# Patient Record
Sex: FEMALE | Race: BLACK OR AFRICAN AMERICAN | NOT HISPANIC OR LATINO | Employment: FULL TIME | URBAN - METROPOLITAN AREA
[De-identification: names, ages, dates, MRNs, and addresses within clinical notes are randomized per-mention and may not be internally consistent; named-entity substitution may affect disease eponyms.]

---

## 2023-04-25 ENCOUNTER — APPOINTMENT (OUTPATIENT)
Dept: PRIMARY CARE | Facility: CLINIC | Age: 58
End: 2023-04-25
Payer: COMMERCIAL

## 2023-09-05 ENCOUNTER — OFFICE VISIT (OUTPATIENT)
Dept: PRIMARY CARE | Facility: CLINIC | Age: 58
End: 2023-09-05
Payer: COMMERCIAL

## 2023-09-05 VITALS
BODY MASS INDEX: 23.96 KG/M2 | DIASTOLIC BLOOD PRESSURE: 96 MMHG | SYSTOLIC BLOOD PRESSURE: 157 MMHG | WEIGHT: 161.8 LBS | TEMPERATURE: 97.8 F | RESPIRATION RATE: 18 BRPM | HEIGHT: 69 IN | HEART RATE: 62 BPM

## 2023-09-05 DIAGNOSIS — I10 HYPERTENSION, UNSPECIFIED TYPE: ICD-10-CM

## 2023-09-05 DIAGNOSIS — R51.9 ACUTE NONINTRACTABLE HEADACHE, UNSPECIFIED HEADACHE TYPE: ICD-10-CM

## 2023-09-05 DIAGNOSIS — S06.0X0D CONCUSSION WITHOUT LOSS OF CONSCIOUSNESS, SUBSEQUENT ENCOUNTER: Primary | ICD-10-CM

## 2023-09-05 DIAGNOSIS — H05.89 ORBITAL LESION: ICD-10-CM

## 2023-09-05 DIAGNOSIS — E55.9 HYPOVITAMINOSIS D: ICD-10-CM

## 2023-09-05 DIAGNOSIS — Z00.00 HEALTHCARE MAINTENANCE: ICD-10-CM

## 2023-09-05 DIAGNOSIS — Z12.11 COLON CANCER SCREENING: ICD-10-CM

## 2023-09-05 PROBLEM — S06.0X0A CONCUSSION WITH NO LOSS OF CONSCIOUSNESS: Status: ACTIVE | Noted: 2023-09-05

## 2023-09-05 LAB
ALANINE AMINOTRANSFERASE (SGPT) (U/L) IN SER/PLAS: 9 U/L (ref 7–45)
ALBUMIN (G/DL) IN SER/PLAS: 4.4 G/DL (ref 3.4–5)
ALKALINE PHOSPHATASE (U/L) IN SER/PLAS: 74 U/L (ref 33–110)
ANION GAP IN SER/PLAS: 15 MMOL/L (ref 10–20)
ASPARTATE AMINOTRANSFERASE (SGOT) (U/L) IN SER/PLAS: 13 U/L (ref 9–39)
BILIRUBIN TOTAL (MG/DL) IN SER/PLAS: 0.2 MG/DL (ref 0–1.2)
CALCIDIOL (25 OH VITAMIN D3) (NG/ML) IN SER/PLAS: 16 NG/ML
CALCIUM (MG/DL) IN SER/PLAS: 9.8 MG/DL (ref 8.6–10.6)
CARBON DIOXIDE, TOTAL (MMOL/L) IN SER/PLAS: 28 MMOL/L (ref 21–32)
CHLORIDE (MMOL/L) IN SER/PLAS: 105 MMOL/L (ref 98–107)
CHOLESTEROL (MG/DL) IN SER/PLAS: 213 MG/DL (ref 0–199)
CHOLESTEROL IN HDL (MG/DL) IN SER/PLAS: 49.4 MG/DL
CHOLESTEROL/HDL RATIO: 4.3
CREATININE (MG/DL) IN SER/PLAS: 0.79 MG/DL (ref 0.5–1.05)
GFR FEMALE: 86 ML/MIN/1.73M2
GLUCOSE (MG/DL) IN SER/PLAS: 79 MG/DL (ref 74–99)
LDL: 151 MG/DL (ref 0–99)
POTASSIUM (MMOL/L) IN SER/PLAS: 4.8 MMOL/L (ref 3.5–5.3)
PROTEIN TOTAL: 7.9 G/DL (ref 6.4–8.2)
SODIUM (MMOL/L) IN SER/PLAS: 143 MMOL/L (ref 136–145)
TRIGLYCERIDE (MG/DL) IN SER/PLAS: 61 MG/DL (ref 0–149)
UREA NITROGEN (MG/DL) IN SER/PLAS: 12 MG/DL (ref 6–23)
VLDL: 12 MG/DL (ref 0–40)

## 2023-09-05 PROCEDURE — 3077F SYST BP >= 140 MM HG: CPT | Performed by: INTERNAL MEDICINE

## 2023-09-05 PROCEDURE — 86803 HEPATITIS C AB TEST: CPT

## 2023-09-05 PROCEDURE — 80061 LIPID PANEL: CPT

## 2023-09-05 PROCEDURE — 80053 COMPREHEN METABOLIC PANEL: CPT

## 2023-09-05 PROCEDURE — 99213 OFFICE O/P EST LOW 20 MIN: CPT | Performed by: INTERNAL MEDICINE

## 2023-09-05 PROCEDURE — 99396 PREV VISIT EST AGE 40-64: CPT | Performed by: INTERNAL MEDICINE

## 2023-09-05 PROCEDURE — 3080F DIAST BP >= 90 MM HG: CPT | Performed by: INTERNAL MEDICINE

## 2023-09-05 PROCEDURE — 87389 HIV-1 AG W/HIV-1&-2 AB AG IA: CPT

## 2023-09-05 PROCEDURE — 82306 VITAMIN D 25 HYDROXY: CPT

## 2023-09-05 RX ORDER — VENLAFAXINE HYDROCHLORIDE 75 MG/1
75 CAPSULE, EXTENDED RELEASE ORAL DAILY
COMMUNITY
Start: 2023-03-27 | End: 2023-11-16 | Stop reason: ALTCHOICE

## 2023-09-05 RX ORDER — PNEUMOCOCCAL VACCINE POLYVALENT 25; 25; 25; 25; 25; 25; 25; 25; 25; 25; 25; 25; 25; 25; 25; 25; 25; 25; 25; 25; 25; 25; 25 UG/.5ML; UG/.5ML; UG/.5ML; UG/.5ML; UG/.5ML; UG/.5ML; UG/.5ML; UG/.5ML; UG/.5ML; UG/.5ML; UG/.5ML; UG/.5ML; UG/.5ML; UG/.5ML; UG/.5ML; UG/.5ML; UG/.5ML; UG/.5ML; UG/.5ML; UG/.5ML; UG/.5ML; UG/.5ML; UG/.5ML
0.5 INJECTION, SOLUTION INTRAMUSCULAR; SUBCUTANEOUS ONCE
Qty: 0.5 ML | Refills: 0 | Status: SHIPPED | OUTPATIENT
Start: 2023-09-05 | End: 2023-09-05

## 2023-09-05 RX ORDER — IBUPROFEN 600 MG/1
600 TABLET ORAL 3 TIMES DAILY
COMMUNITY
Start: 2023-08-26

## 2023-09-05 RX ORDER — CYCLOBENZAPRINE HCL 10 MG
10 TABLET ORAL NIGHTLY
COMMUNITY
Start: 2023-08-09 | End: 2023-09-05 | Stop reason: SINTOL

## 2023-09-05 SDOH — ECONOMIC STABILITY: TRANSPORTATION INSECURITY
IN THE PAST 12 MONTHS, HAS LACK OF TRANSPORTATION KEPT YOU FROM MEETINGS, WORK, OR FROM GETTING THINGS NEEDED FOR DAILY LIVING?: NO

## 2023-09-05 SDOH — ECONOMIC STABILITY: FOOD INSECURITY: WITHIN THE PAST 12 MONTHS, YOU WORRIED THAT YOUR FOOD WOULD RUN OUT BEFORE YOU GOT MONEY TO BUY MORE.: NEVER TRUE

## 2023-09-05 SDOH — ECONOMIC STABILITY: HOUSING INSECURITY
IN THE LAST 12 MONTHS, WAS THERE A TIME WHEN YOU DID NOT HAVE A STEADY PLACE TO SLEEP OR SLEPT IN A SHELTER (INCLUDING NOW)?: NO

## 2023-09-05 SDOH — ECONOMIC STABILITY: INCOME INSECURITY: IN THE LAST 12 MONTHS, WAS THERE A TIME WHEN YOU WERE NOT ABLE TO PAY THE MORTGAGE OR RENT ON TIME?: NO

## 2023-09-05 SDOH — ECONOMIC STABILITY: TRANSPORTATION INSECURITY
IN THE PAST 12 MONTHS, HAS THE LACK OF TRANSPORTATION KEPT YOU FROM MEDICAL APPOINTMENTS OR FROM GETTING MEDICATIONS?: NO

## 2023-09-05 SDOH — ECONOMIC STABILITY: FOOD INSECURITY: WITHIN THE PAST 12 MONTHS, THE FOOD YOU BOUGHT JUST DIDN'T LAST AND YOU DIDN'T HAVE MONEY TO GET MORE.: NEVER TRUE

## 2023-09-05 SDOH — HEALTH STABILITY: PHYSICAL HEALTH: ON AVERAGE, HOW MANY DAYS PER WEEK DO YOU ENGAGE IN MODERATE TO STRENUOUS EXERCISE (LIKE A BRISK WALK)?: 6 DAYS

## 2023-09-05 SDOH — HEALTH STABILITY: PHYSICAL HEALTH: ON AVERAGE, HOW MANY MINUTES DO YOU ENGAGE IN EXERCISE AT THIS LEVEL?: 60 MIN

## 2023-09-05 ASSESSMENT — ACTIVITIES OF DAILY LIVING (ADL)
GROCERY_SHOPPING: INDEPENDENT
BATHING: INDEPENDENT
DOING_HOUSEWORK: INDEPENDENT
TAKING_MEDICATION: INDEPENDENT
MANAGING_FINANCES: INDEPENDENT
DRESSING: INDEPENDENT

## 2023-09-05 ASSESSMENT — ENCOUNTER SYMPTOMS
CHILLS: 0
OCCASIONAL FEELINGS OF UNSTEADINESS: 0
DEPRESSION: 0
WEAKNESS: 0
SORE THROAT: 0
DIARRHEA: 0
ACTIVITY CHANGE: 0
PALPITATIONS: 0
NAUSEA: 0
SHORTNESS OF BREATH: 0
LOSS OF SENSATION IN FEET: 0
LIGHT-HEADEDNESS: 1
SINUS PRESSURE: 0
CHEST TIGHTNESS: 0
MYALGIAS: 0
FACIAL SWELLING: 0
HEADACHES: 1
AGITATION: 0

## 2023-09-05 ASSESSMENT — SOCIAL DETERMINANTS OF HEALTH (SDOH)
IN A TYPICAL WEEK, HOW MANY TIMES DO YOU TALK ON THE PHONE WITH FAMILY, FRIENDS, OR NEIGHBORS?: MORE THAN THREE TIMES A WEEK
IN THE PAST 12 MONTHS, HAS THE ELECTRIC, GAS, OIL, OR WATER COMPANY THREATENED TO SHUT OFF SERVICE IN YOUR HOME?: NO
HOW OFTEN DO YOU ATTEND CHURCH OR RELIGIOUS SERVICES?: MORE THAN 4 TIMES PER YEAR
WITHIN THE LAST YEAR, HAVE YOU BEEN KICKED, HIT, SLAPPED, OR OTHERWISE PHYSICALLY HURT BY YOUR PARTNER OR EX-PARTNER?: NO
HOW OFTEN DO YOU GET TOGETHER WITH FRIENDS OR RELATIVES?: MORE THAN THREE TIMES A WEEK
WITHIN THE LAST YEAR, HAVE TO BEEN RAPED OR FORCED TO HAVE ANY KIND OF SEXUAL ACTIVITY BY YOUR PARTNER OR EX-PARTNER?: NO
ARE YOU MARRIED, WIDOWED, DIVORCED, SEPARATED, NEVER MARRIED, OR LIVING WITH A PARTNER?: PATIENT DECLINED
HOW OFTEN DO YOU ATTENT MEETINGS OF THE CLUB OR ORGANIZATION YOU BELONG TO?: PATIENT DECLINED
WITHIN THE LAST YEAR, HAVE YOU BEEN AFRAID OF YOUR PARTNER OR EX-PARTNER?: NO
HOW HARD IS IT FOR YOU TO PAY FOR THE VERY BASICS LIKE FOOD, HOUSING, MEDICAL CARE, AND HEATING?: NOT VERY HARD
WITHIN THE LAST YEAR, HAVE YOU BEEN HUMILIATED OR EMOTIONALLY ABUSED IN OTHER WAYS BY YOUR PARTNER OR EX-PARTNER?: NO
DO YOU BELONG TO ANY CLUBS OR ORGANIZATIONS SUCH AS CHURCH GROUPS UNIONS, FRATERNAL OR ATHLETIC GROUPS, OR SCHOOL GROUPS?: PATIENT DECLINED

## 2023-09-05 ASSESSMENT — COLUMBIA-SUICIDE SEVERITY RATING SCALE - C-SSRS
1. IN THE PAST MONTH, HAVE YOU WISHED YOU WERE DEAD OR WISHED YOU COULD GO TO SLEEP AND NOT WAKE UP?: NO
2. HAVE YOU ACTUALLY HAD ANY THOUGHTS OF KILLING YOURSELF?: NO
6. HAVE YOU EVER DONE ANYTHING, STARTED TO DO ANYTHING, OR PREPARED TO DO ANYTHING TO END YOUR LIFE?: NO

## 2023-09-05 ASSESSMENT — ANXIETY QUESTIONNAIRES
GAD7 TOTAL SCORE: 0
6. BECOMING EASILY ANNOYED OR IRRITABLE: NOT AT ALL
IF YOU CHECKED OFF ANY PROBLEMS ON THIS QUESTIONNAIRE, HOW DIFFICULT HAVE THESE PROBLEMS MADE IT FOR YOU TO DO YOUR WORK, TAKE CARE OF THINGS AT HOME, OR GET ALONG WITH OTHER PEOPLE: NOT DIFFICULT AT ALL
2. NOT BEING ABLE TO STOP OR CONTROL WORRYING: NOT AT ALL
5. BEING SO RESTLESS THAT IT IS HARD TO SIT STILL: NOT AT ALL
1. FEELING NERVOUS, ANXIOUS, OR ON EDGE: NOT AT ALL
4. TROUBLE RELAXING: NOT AT ALL
3. WORRYING TOO MUCH ABOUT DIFFERENT THINGS: NOT AT ALL
7. FEELING AFRAID AS IF SOMETHING AWFUL MIGHT HAPPEN: NOT AT ALL

## 2023-09-05 ASSESSMENT — PATIENT HEALTH QUESTIONNAIRE - PHQ9
2. FEELING DOWN, DEPRESSED OR HOPELESS: NOT AT ALL
SUM OF ALL RESPONSES TO PHQ9 QUESTIONS 1 & 2: 0
1. LITTLE INTEREST OR PLEASURE IN DOING THINGS: NOT AT ALL

## 2023-09-05 ASSESSMENT — PAIN SCALES - GENERAL: PAINLEVEL: 5
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
## 2023-09-06 LAB
HEPATITIS C VIRUS AB PRESENCE IN SERUM: NONREACTIVE
HIV 1/ 2 AG/AB SCREEN: NONREACTIVE

## 2023-09-28 ENCOUNTER — TELEPHONE (OUTPATIENT)
Dept: PRIMARY CARE | Facility: CLINIC | Age: 58
End: 2023-09-28
Payer: COMMERCIAL

## 2023-09-28 DIAGNOSIS — F17.200 SMOKING ADDICTION: Primary | ICD-10-CM

## 2023-09-28 RX ORDER — VARENICLINE TARTRATE 1 MG/1
1 TABLET, FILM COATED ORAL 2 TIMES DAILY
Qty: 60 TABLET | Refills: 2 | Status: SHIPPED | OUTPATIENT
Start: 2023-09-28 | End: 2023-10-22

## 2023-09-28 NOTE — TELEPHONE ENCOUNTER
Result Communication    Resulted Orders   CT lung screening low dose    Narrative    Interpreted By:  MAREN GRACE MD  MRN: [id number]  Patient Name: YURIY MARK     STUDY:  TH CT CHEST LOW DOSE FOR LUNG SCREENING WO CONTRAST;  9/27/2023 3:05  pm     INDICATION:  Smoking.     COMPARISON:  None.     ACCESSION NUMBER(S):  [id number]     ORDERING CLINICIAN:  VIOLETA MELENDEZ     TECHNIQUE:  Helical data acquisition of the chest was obtained without IV  contrast material.  Images were reformatted in axial, coronal, and  sagittal planes.     FINDINGS:  LUNGS AND AIRWAYS:  The trachea and central airways are patent. No endobronchial lesion  is seen.     There is severe emphysematous changes.There is no focal  consolidation, pleural effusion, or pneumothorax.  Within the superior segment of the right lower lobe at the level of  the azygous esophageal recess there are prominent ground-glass  opacities with calcifications most consistent with atelectasis     There are multiple calcified granulomas  There is a 4 mm right lower lobe parenchymal lung nodule.  There are no suspicious parenchymal lung nodules     MEDIASTINUM AND SUNITA, LOWER NECK AND AXILLA:  The visualized thyroid gland is within normal limits.  No evidence of thoracic lymphadenopathy by CT criteria.  Esophagus appears within normal limits as seen.     HEART AND VESSELS:  There is minimal atherosclerotic changes of the thoracic aorta.  Main pulmonary artery and its branches are normal in caliber.  No coronary artery calcifications are seen. Please note, the study is  not optimized for evaluation of coronary arteries.  The cardiac chambers are not enlarged.  There is no pericardial effusion seen.     UPPER ABDOMEN:  The visualized subdiaphragmatic structures demonstrate no remarkable  findings.           CHEST WALL AND OSSEOUS STRUCTURES:  Chest wall is within normal limits.  No acute osseous pathology.There are no suspicious osseous lesions.       Impression    1.  There are no suspicious parenchymal lung nodules. Recommendations  are for continued continued 1 year low-dose chest CT for surveillance  2. Multiple calcified granulomas and severe emphysematous changes.        LUNG RADS CATEGORY:  Lung-RADS 2 (Benign Appearance or Indolent behavior): Continue with  annual screening in 12 months,  CT Chest Lung Ca Screen Initial  or Follow up LR1/LR2/Continuation of Screening Low Dose recommended  as per American College of Radiology Guidelines Lung-RADS Version  2022.          11:50 AM      Results were successfully communicated with the patient and they acknowledged their understanding. She will try chantix for smoking cessation and laser therapy if that doesn't work.

## 2023-10-04 ENCOUNTER — TELEPHONE (OUTPATIENT)
Dept: PRIMARY CARE | Facility: CLINIC | Age: 58
End: 2023-10-04
Payer: COMMERCIAL

## 2023-10-04 DIAGNOSIS — N63.10 MASS OF RIGHT BREAST, UNSPECIFIED QUADRANT: Primary | ICD-10-CM

## 2023-10-04 NOTE — TELEPHONE ENCOUNTER
Result Communication    No results found from the In Basket message.    6:32 PM  Mammogram    Results were successfully communicated with the patient and they acknowledged their understanding.

## 2023-10-10 ENCOUNTER — OFFICE VISIT (OUTPATIENT)
Dept: PRIMARY CARE | Facility: CLINIC | Age: 58
End: 2023-10-10
Payer: COMMERCIAL

## 2023-10-10 VITALS
TEMPERATURE: 97 F | SYSTOLIC BLOOD PRESSURE: 146 MMHG | HEART RATE: 71 BPM | BODY MASS INDEX: 24.14 KG/M2 | HEIGHT: 69 IN | DIASTOLIC BLOOD PRESSURE: 74 MMHG | OXYGEN SATURATION: 97 % | WEIGHT: 163 LBS | RESPIRATION RATE: 18 BRPM

## 2023-10-10 DIAGNOSIS — S09.90XS HEADACHES DUE TO OLD HEAD INJURY: ICD-10-CM

## 2023-10-10 DIAGNOSIS — Z12.11 COLON CANCER SCREENING: ICD-10-CM

## 2023-10-10 DIAGNOSIS — R63.4 WEIGHT LOSS: Primary | ICD-10-CM

## 2023-10-10 DIAGNOSIS — J43.1 PANLOBULAR EMPHYSEMA (MULTI): ICD-10-CM

## 2023-10-10 DIAGNOSIS — G47.00 INSOMNIA DISORDER WITH NON-SLEEP DISORDER MENTAL COMORBIDITY: ICD-10-CM

## 2023-10-10 DIAGNOSIS — G44.309 HEADACHES DUE TO OLD HEAD INJURY: ICD-10-CM

## 2023-10-10 LAB — TSH SERPL-ACNC: 0.8 MIU/L (ref 0.44–3.98)

## 2023-10-10 PROCEDURE — 3078F DIAST BP <80 MM HG: CPT | Performed by: INTERNAL MEDICINE

## 2023-10-10 PROCEDURE — 99214 OFFICE O/P EST MOD 30 MIN: CPT | Performed by: INTERNAL MEDICINE

## 2023-10-10 PROCEDURE — 36415 COLL VENOUS BLD VENIPUNCTURE: CPT

## 2023-10-10 PROCEDURE — 3077F SYST BP >= 140 MM HG: CPT | Performed by: INTERNAL MEDICINE

## 2023-10-10 PROCEDURE — 84443 ASSAY THYROID STIM HORMONE: CPT

## 2023-10-10 RX ORDER — BUDESONIDE AND FORMOTEROL FUMARATE DIHYDRATE 80; 4.5 UG/1; UG/1
2 AEROSOL RESPIRATORY (INHALATION) 2 TIMES DAILY
COMMUNITY
Start: 2021-09-07 | End: 2023-11-16 | Stop reason: ALTCHOICE

## 2023-10-10 RX ORDER — ALBUTEROL SULFATE 90 UG/1
2-4 AEROSOL, METERED RESPIRATORY (INHALATION) EVERY 2 HOUR PRN
COMMUNITY
Start: 2021-09-07

## 2023-10-10 RX ORDER — IPRATROPIUM BROMIDE AND ALBUTEROL SULFATE 2.5; .5 MG/3ML; MG/3ML
3 SOLUTION RESPIRATORY (INHALATION)
COMMUNITY
Start: 2021-09-07

## 2023-10-10 RX ORDER — PREDNISONE 5 MG/1
TABLET ORAL
COMMUNITY
Start: 2021-09-07 | End: 2023-11-16 | Stop reason: ALTCHOICE

## 2023-10-10 RX ORDER — IBUPROFEN 200 MG
1 TABLET ORAL
COMMUNITY
Start: 2021-09-07

## 2023-10-10 ASSESSMENT — ENCOUNTER SYMPTOMS
CONSTIPATION: 0
DIARRHEA: 0
SHORTNESS OF BREATH: 0
HEADACHES: 1
PALPITATIONS: 0
CHEST TIGHTNESS: 0
MYALGIAS: 0
SINUS PRESSURE: 0
ACTIVITY CHANGE: 0
FATIGUE: 1
SORE THROAT: 0
WHEEZING: 0
DECREASED CONCENTRATION: 1
SLEEP DISTURBANCE: 1
AGITATION: 0
TREMORS: 1
CHILLS: 0
NAUSEA: 0
NERVOUS/ANXIOUS: 1
WEAKNESS: 0

## 2023-10-10 ASSESSMENT — PAIN SCALES - GENERAL: PAINLEVEL: 0-NO PAIN

## 2023-10-10 NOTE — PROGRESS NOTES
"Subjective   Patient ID: Julissa Marks is a 58 y.o. female who presents for Follow-up (1 MONTH FOLLOW).  She has lost 10 pounds over the last couple ofd month. She thinks she is forgetting a lot.  She is stressed.  Sleeping poorly.  Dosing at work.  No heart palpitations. She is easily stressed and irritated by stressors. She is getting forgetful and absent-minded.   She decided to go with cologuard instead of colonoscopy.     HPI     Review of Systems   Constitutional:  Positive for fatigue. Negative for activity change and chills.   HENT:  Negative for congestion, mouth sores, sinus pressure and sore throat.    Respiratory:  Negative for chest tightness, shortness of breath and wheezing.    Cardiovascular:  Negative for chest pain and palpitations.   Gastrointestinal:  Negative for constipation, diarrhea and nausea.   Musculoskeletal:  Negative for myalgias.   Neurological:  Positive for tremors and headaches. Negative for weakness.   Psychiatric/Behavioral:  Positive for decreased concentration and sleep disturbance. Negative for agitation and behavioral problems. The patient is nervous/anxious.        Objective   /74 (BP Location: Right arm, Patient Position: Sitting, BP Cuff Size: Large adult)   Pulse 71   Temp 36.1 °C (97 °F) (Temporal)   Resp 18   Ht 1.753 m (5' 9\")   Wt 73.9 kg (163 lb)   SpO2 97%   BMI 24.07 kg/m²     Physical Exam  Constitutional:       Appearance: Normal appearance.   HENT:      Head: Normocephalic and atraumatic.      Nose: Nose normal.      Mouth/Throat:      Mouth: Mucous membranes are moist.   Eyes:      Extraocular Movements: Extraocular movements intact.      Pupils: Pupils are equal, round, and reactive to light.   Cardiovascular:      Rate and Rhythm: Normal rate and regular rhythm.   Pulmonary:      Effort: No respiratory distress.      Breath sounds: No wheezing.   Abdominal:      General: Bowel sounds are normal.      Palpations: Abdomen is soft.   Neurological:    "   General: No focal deficit present.      Mental Status: She is alert.   Psychiatric:         Mood and Affect: Mood is anxious.         Speech: She is communicative.         Behavior: Behavior is agitated.         Thought Content: Thought content does not include suicidal plan.         Cognition and Memory: Cognition is not impaired. Memory is not impaired.         Judgment: Judgment is not impulsive or inappropriate.         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  She will follow up with screening test as ordered.  She has also lost weight and continues to smoke.  She is taking the chantix with some improvement.

## 2023-10-16 ENCOUNTER — TELEPHONE (OUTPATIENT)
Dept: PRIMARY CARE | Facility: HOSPITAL | Age: 58
End: 2023-10-16
Payer: COMMERCIAL

## 2023-10-16 ENCOUNTER — ANCILLARY PROCEDURE (OUTPATIENT)
Dept: RADIOLOGY | Facility: CLINIC | Age: 58
End: 2023-10-16
Payer: COMMERCIAL

## 2023-10-16 DIAGNOSIS — N63.10 MASS OF RIGHT BREAST, UNSPECIFIED QUADRANT: ICD-10-CM

## 2023-10-16 DIAGNOSIS — R92.8 ABNORMAL MAMMOGRAM: Primary | ICD-10-CM

## 2023-10-16 PROCEDURE — 76642 ULTRASOUND BREAST LIMITED: CPT | Mod: RT

## 2023-10-16 PROCEDURE — 76642 ULTRASOUND BREAST LIMITED: CPT | Mod: RIGHT SIDE | Performed by: RADIOLOGY

## 2023-10-16 PROCEDURE — 76981 USE PARENCHYMA: CPT | Mod: RIGHT SIDE | Performed by: RADIOLOGY

## 2023-10-17 ENCOUNTER — TELEPHONE (OUTPATIENT)
Dept: PRIMARY CARE | Facility: CLINIC | Age: 58
End: 2023-10-17
Payer: COMMERCIAL

## 2023-10-17 NOTE — TELEPHONE ENCOUNTER
Result Communication    Resulted Orders   BI US breast limited right    Narrative    Interpreted By:  Ernst Zamudio,   STUDY:  BI US BREAST LIMITED RIGHT;  10/16/2023 2:01 pm      ACCESSION NUMBER(S):  [id number]      ORDERING CLINICIAN:  VIOLETA MELENDEZ      INDICATION:  Callback from screening      COMPARISON:  09/27/2023      FINDINGS:  Sonographic images were obtained of the right breast. At the 11  o'clock position 3 cm from the nipple the correlate for the  mammographic mass is seen. It is a hypoechoic ill-defined irregular  mass containing calcifications. It is hard on elastography. There is  no internal vascularity. The mass measures 1.1 x 0.7 x 0.5 cm.      Sonographic images of the axilla demonstrate no adenopathy.        Impression    Correlate for the mammographic mass is identified. Both the  mammographic and sonographic features are suspicious and biopsy is  recommended. The mass is much more evident mammographically and  therefore stereotactic biopsy is recommended.      The findings and recommendation were discussed with the patient at  the time of interpretation. She will be scheduled for surgical  consultation and biopsy. A notification was sent to the ordering  provider.      BI-RADS CATEGORY:      BI-RADS Category:  4 Suspicious.  Recommendation:  Biopsy.  Recommended Date:  Immediate.  Laterality:  Right      For any future breast imaging appointments, please call [phone]).      Method of Detection: Category Sdbt - 3D Screening      MACRO:  Critical Finding:  See findings. Notification was initiated on  10/16/2023 at 4:03 pm by  Ernst Zamudio.  (**-YCF-**) Instructions:  See Impression for specific recommendations.      Signed by: Ernst Zamudio 10/16/2023 4:15 PM  Dictation workstation:   [id number]         3:28 PM      Results were successfully communicated with the patient and they acknowledged their understanding.

## 2023-10-18 ENCOUNTER — TELEPHONE (OUTPATIENT)
Dept: PRIMARY CARE | Facility: CLINIC | Age: 58
End: 2023-10-18
Payer: COMMERCIAL

## 2023-10-18 NOTE — TELEPHONE ENCOUNTER
Result Communication    Resulted Orders   BI US breast limited right    Narrative    Interpreted By:  Ernst Zamudio,   STUDY:  BI US BREAST LIMITED RIGHT;  10/16/2023 2:01 pm      ACCESSION NUMBER(S):  [id number]      ORDERING CLINICIAN:  VIOLETA MELENDEZ      INDICATION:  Callback from screening      COMPARISON:  09/27/2023      FINDINGS:  Sonographic images were obtained of the right breast. At the 11  o'clock position 3 cm from the nipple the correlate for the  mammographic mass is seen. It is a hypoechoic ill-defined irregular  mass containing calcifications. It is hard on elastography. There is  no internal vascularity. The mass measures 1.1 x 0.7 x 0.5 cm.      Sonographic images of the axilla demonstrate no adenopathy.        Impression    Correlate for the mammographic mass is identified. Both the  mammographic and sonographic features are suspicious and biopsy is  recommended. The mass is much more evident mammographically and  therefore stereotactic biopsy is recommended.      The findings and recommendation were discussed with the patient at  the time of interpretation. She will be scheduled for surgical  consultation and biopsy. A notification was sent to the ordering  provider.      BI-RADS CATEGORY:      BI-RADS Category:  4 Suspicious.  Recommendation:  Biopsy.  Recommended Date:  Immediate.  Laterality:  Right      For any future breast imaging appointments, please call [phone]).      Method of Detection: Category Sdbt - 3D Screening      MACRO:  Critical Finding:  See findings. Notification was initiated on  10/16/2023 at 4:03 pm by  Ernst Zamudio.  (**-YCF-**) Instructions:  See Impression for specific recommendations.      Signed by: Ernst Zamudio 10/16/2023 4:15 PM  Dictation workstation:   [id number]         6:07 PM      Results were successfully communicated with the patient and they acknowledged their understanding.

## 2023-10-20 DIAGNOSIS — F17.200 SMOKING ADDICTION: ICD-10-CM

## 2023-10-22 RX ORDER — VARENICLINE TARTRATE 1 MG/1
1 TABLET, FILM COATED ORAL 2 TIMES DAILY
Qty: 60 TABLET | Refills: 2 | Status: SHIPPED | OUTPATIENT
Start: 2023-10-22 | End: 2024-01-20

## 2023-10-24 PROBLEM — R03.0 ELEVATED BLOOD PRESSURE READING WITHOUT DIAGNOSIS OF HYPERTENSION: Status: ACTIVE | Noted: 2023-10-24

## 2023-10-24 PROBLEM — F17.200 NICOTINE USE DISORDER: Status: ACTIVE | Noted: 2021-09-05

## 2023-10-24 PROBLEM — J44.9 CHRONIC OBSTRUCTIVE PULMONARY DISEASE (MULTI): Status: ACTIVE | Noted: 2023-10-24

## 2023-10-27 ENCOUNTER — HOSPITAL ENCOUNTER (OUTPATIENT)
Dept: RADIOLOGY | Facility: HOSPITAL | Age: 58
Discharge: HOME | End: 2023-10-27
Payer: COMMERCIAL

## 2023-10-27 DIAGNOSIS — R63.4 WEIGHT LOSS: ICD-10-CM

## 2023-10-27 DIAGNOSIS — S09.90XS HEADACHES DUE TO OLD HEAD INJURY: ICD-10-CM

## 2023-10-27 DIAGNOSIS — G44.309 HEADACHES DUE TO OLD HEAD INJURY: ICD-10-CM

## 2023-10-27 PROCEDURE — 70450 CT HEAD/BRAIN W/O DYE: CPT | Performed by: RADIOLOGY

## 2023-10-27 PROCEDURE — 70450 CT HEAD/BRAIN W/O DYE: CPT

## 2023-10-30 ENCOUNTER — APPOINTMENT (OUTPATIENT)
Dept: SURGICAL ONCOLOGY | Facility: CLINIC | Age: 58
End: 2023-10-30
Payer: COMMERCIAL

## 2023-10-30 ENCOUNTER — APPOINTMENT (OUTPATIENT)
Dept: RADIOLOGY | Facility: CLINIC | Age: 58
End: 2023-10-30
Payer: COMMERCIAL

## 2023-11-06 NOTE — PROGRESS NOTES
Julissa Marks female   1965 58 y.o.   [id number]           HPI  Julissa Marks is a 58 y.o.  AA female referred by Mikey Shin MD   [facility].   Right axillary ultrasound normal.  She denies breast biopsy or surgery.    BREAST IMAGIN2023 bilateral new baseline screening mammogram indicated BI-RADS Category 0, right breast mass with associated calcifications and distortion.  10/16/2023 left breast ultrasound indicated BI-RADS Category 4 right breast 11:00 3 cm from the nipple 1.1 x 0.7 x 0.5 cm hardmass more evident mammographically, stereotactic core biopsy recommended.     REPRODUCTIVE HISTORY: menarche age***  menopause age ***, heterogeneously dense breast tissue     FAMILY CANCER HISTORY:       REVIEW OF SYSTEMS    Constitutional:  Negative for appetite change, fatigue, fever and unexpected weight change.   HENT:  Negative for ear pain, hearing loss, nosebleeds, sore throat and trouble swallowing.    Eyes:  Negative for discharge, itching and visual disturbance.   Respiratory:  Negative for cough, chest tightness and shortness of breath.    Cardiovascular:  Negative for chest pain, palpitations and leg swelling.   Breast: as indicated in HPI  Gastrointestinal:  Negative for abdominal pain, constipation, diarrhea and nausea.   Endocrine: Negative for cold intolerance and heat intolerance.   Genitourinary:  Negative for dysuria, frequency, hematuria, pelvic pain and vaginal bleeding.   Musculoskeletal:  Negative for arthralgias, back pain, gait problem, joint swelling and myalgias.   Skin:  Negative for color change and rash.   Allergic/Immunologic: Negative for environmental allergies and food allergies.   Neurological:  Negative for dizziness, tremors, speech difficulty, weakness, numbness and headaches.   Hematological:  Does not bruise/bleed easily.   Psychiatric/Behavioral:  Negative for agitation, dysphoric mood and sleep disturbance. The patient is not  nervous/anxious.         MEDICATIONS  Current Outpatient Medications   Medication Instructions    albuterol 90 mcg/actuation inhaler 2-4 puffs, inhalation, Every 2 hour PRN    budesonide-formoteroL (Symbicort) 80-4.5 mcg/actuation inhaler 2 puffs, inhalation, 2 times daily    ibuprofen 600 mg, oral, 3 times daily    ipratropium-albuteroL (Duo-Neb) 0.5-2.5 mg/3 mL nebulizer solution 3 mL, inhalation    nicotine (Nicoderm CQ) 21 mg/24 hr patch 1 patch, transdermal, Daily RT    predniSONE 5 mg tablets,dose pack 6 tabs first day,5 tabs second day,4 tabs third day,3 tabs fourth day,2 tabs fifth day and 1 tab sixth day.    varenicline (CHANTIX) 1 mg, oral, 2 times daily, Take with full glass of water.    venlafaxine XR (EFFEXOR-XR) 75 mg, oral, Daily        ALLERGIES  No Known Allergies     No past medical history on file.   No past surgical history on file.   Family History   Family history unknown: Yes          SOCIAL HISTORY      Social History     Tobacco Use    Smoking status: Every Day     Packs/day: 1     Types: Cigarettes    Smokeless tobacco: Never   Substance Use Topics    Alcohol use: Never        VITALS  There were no vitals filed for this visit.     PHYSICAL EXAM  Patient is alert and oriented x3, with appropriate mood. The gait is steady and hand grasps are equal. Sclera clear. The breasts are nearly symmetrical. The tissue is soft without palpable abnormalities, discrete nodules or masses. The skin and nipples appear normal. There is no cervical, supraclavicular, or axillary lymphadenopathy palpable. Heart rate and rhythm normal, S1 and S2 appreciated. The lungs are clear bilaterally. Abdomen is soft & non-tender.    Physical Exam     IMAGING      Time was spent viewing digital images of the radiology testing with the patient. I explained the results in depth, along with suggested explanation for follow up recommendations based on the testing results.          ORDERS  No orders of the defined types were  placed in this encounter.         ASSESSMENT/PLAN  No diagnosis found.   There are no diagnoses linked to this encounter.     Proceed to biopsy. A breast radiology physician will perform the biopsy. Results are usually available in 3-7 business days. I will call patient with results and instruct on next steps and plan.          Amada Hanson, [facility]

## 2023-11-07 ENCOUNTER — APPOINTMENT (OUTPATIENT)
Dept: SURGICAL ONCOLOGY | Facility: CLINIC | Age: 58
End: 2023-11-07
Payer: COMMERCIAL

## 2023-11-07 ENCOUNTER — APPOINTMENT (OUTPATIENT)
Dept: RADIOLOGY | Facility: CLINIC | Age: 58
End: 2023-11-07
Payer: COMMERCIAL
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
## 2023-11-16 ENCOUNTER — OFFICE VISIT (OUTPATIENT)
Dept: SLEEP MEDICINE | Facility: CLINIC | Age: 58
End: 2023-11-16
Payer: COMMERCIAL

## 2023-11-16 VITALS
BODY MASS INDEX: 23.7 KG/M2 | WEIGHT: 160 LBS | SYSTOLIC BLOOD PRESSURE: 122 MMHG | DIASTOLIC BLOOD PRESSURE: 87 MMHG | HEART RATE: 69 BPM | HEIGHT: 69 IN | OXYGEN SATURATION: 96 %

## 2023-11-16 DIAGNOSIS — G47.00 INSOMNIA DISORDER WITH NON-SLEEP DISORDER MENTAL COMORBIDITY: ICD-10-CM

## 2023-11-16 DIAGNOSIS — F41.9 ANXIETY: Primary | ICD-10-CM

## 2023-11-16 PROCEDURE — 99204 OFFICE O/P NEW MOD 45 MIN: CPT | Performed by: PHYSICIAN ASSISTANT

## 2023-11-16 PROCEDURE — 99214 OFFICE O/P EST MOD 30 MIN: CPT | Performed by: PHYSICIAN ASSISTANT

## 2023-11-16 PROCEDURE — 3074F SYST BP LT 130 MM HG: CPT | Performed by: PHYSICIAN ASSISTANT

## 2023-11-16 PROCEDURE — 3079F DIAST BP 80-89 MM HG: CPT | Performed by: PHYSICIAN ASSISTANT

## 2023-11-16 RX ORDER — MIRTAZAPINE 7.5 MG/1
7.5 TABLET, FILM COATED ORAL NIGHTLY
Qty: 30 TABLET | Refills: 0 | Status: SHIPPED | OUTPATIENT
Start: 2023-11-16 | End: 2023-11-17 | Stop reason: SDUPTHER

## 2023-11-16 ASSESSMENT — SLEEP AND FATIGUE QUESTIONNAIRES
HOW LIKELY ARE YOU TO NOD OFF OR FALL ASLEEP WHILE WATCHING TV: MODERATE CHANCE OF DOZING
HOW LIKELY ARE YOU TO NOD OFF OR FALL ASLEEP WHEN YOU ARE A PASSENGER IN A CAR FOR AN HOUR WITHOUT A BREAK: WOULD NEVER DOZE
SLEEP_PROBLEM_INTERFERES_DAILY_ACTIVITIES: VERY MUCH NOTICEABLE
SLEEP_PROBLEM_NOTICEABLE_TO_OTHERS: VERY MUCH NOTICEABLE
WAKING_TOO_EARLY: SEVERE
DIFFICULTY_FALLING_ASLEEP: SEVERE
ESS-CHAD TOTAL SCORE: 3
HOW LIKELY ARE YOU TO NOD OFF OR FALL ASLEEP WHILE LYING DOWN TO REST IN THE AFTERNOON WHEN CIRCUMSTANCES PERMIT: SLIGHT CHANCE OF DOZING
HOW LIKELY ARE YOU TO NOD OFF OR FALL ASLEEP IN A CAR, WHILE STOPPED FOR A FEW MINUTES IN TRAFFIC: WOULD NEVER DOZE
WORRIED_DISTRESSED_DUE_TO_SLEEP: VERY MUCH NOTICEABLE
HOW LIKELY ARE YOU TO NOD OFF OR FALL ASLEEP WHILE SITTING AND TALKING TO SOMEONE: WOULD NEVER DOZE
DIFFICULTY_STAYING_ASLEEP: SEVERE
SITING INACTIVE IN A PUBLIC PLACE LIKE A CLASS ROOM OR A MOVIE THEATER: WOULD NEVER DOZE
SATISFACTION_WITH_CURRENT_SLEEP_PATTERN: DISSATISFIED
HOW LIKELY ARE YOU TO NOD OFF OR FALL ASLEEP WHILE SITTING QUIETLY AFTER LUNCH WITHOUT ALCOHOL: WOULD NEVER DOZE
HOW LIKELY ARE YOU TO NOD OFF OR FALL ASLEEP WHILE SITTING AND READING: WOULD NEVER DOZE

## 2023-11-16 ASSESSMENT — PAIN SCALES - GENERAL: PAINLEVEL: 0-NO PAIN

## 2023-11-16 NOTE — PATIENT INSTRUCTIONS
Good seeing you today,    We will start mirtazapine for anxiety and for sleep.     Also try going to bed later. I recommend around midnight.    Hernandez Perdue PA-C    Here is some other information regarding insomnia that can be helpful:    Insomnia, or trouble falling asleep or staying asleep, can be caused by many different things such as untreated sleep apnea, anxiety, depression, stress, poor sleep habits, and other medical conditions or medications. The best way to treat insomnia is to treat the cause. In general, we can all benefit from better sleep hygiene (aka good sleep habits). Some recommendations to help you improve your sleep so you can fall asleep, stay asleep, and wake up feeling refreshed include:    Keep a routine bedtime and rise time even on weekends and non-work days. This helps your biological clock stay in sync with your sleep needs. If you currently have variable sleep-wake schedule, start off by waking up and getting out of bed the same time every day, even on weekends or non-work days. Whether you have good or poor sleep, waking up at the same time every day can help re-train and reset your body clock.  Go to bed when you are sleepy and not when tired nor before your goal bedtime. Long periods of time in bed will lead to fragmented, shallow, broken sleep.   Use the bed for sleep and intimacy only. Do not watch TV, eat, read or use phone/laptop in bed. Keeping sleep as the only activity in bed will help re-associate bed equals sleep.  Get up when you cannot sleep in 20-30 minutes. When you are unable to sleep, exit the bed and go to another room or chair in bedroom, do something boring, calm, relaxing, distracting, or non-stimulating in dim lighting until you feel sleepy enough to fall back asleep. Avoid stimulating activity or any triggers for mental thinking (e.g. cellphone). Repeat as needed.  Avoid napping during the day to build up sleep pressure so that it won't be hard for you to fall  "asleep at night. Napping, particularly in the late afternoon or early evening may interfere with your night's sleep. If naps are necessary, limit naps under 15-20 minutes and not later than 3 PM.   Create a \"buffer zone\" or \"wind-down time\". This is a quiet time prior to bedtime, typically at least 30 minutes and perhaps as long as 1-2 hours. During this time, you should do things that are enjoyable, relaxing, and not necessarily goal-oriented like performing relaxation exercises, listening to relaxing music, reading a boring book, dimming the lights, or eating a light bedtime snack like a glass of milk and banana which can promote sleep. Activities that promote relaxation before sleep is important because these can hep quiet the mind and relax the body to get into the right states for sleep.   Do not worry or plan in bed. If you are worrying, planning, feeling anxious, or cannot shut off your thoughts, get up and stay out of bed until you have quieted your mind. Set up a “scheduled worry time” in the morning or late afternoon to write down your worries and stressors as well as plans for the following day.   Keep your bedroom quiet, dark and cool. Ideal sleeping temperature is 65F. If light bothers you, get a slumber mask or blackout shades. If noise bothers you, put ear plugs or get a white noise machine. Alternatively, you may turn on fan or humidifier to create a constant background noise to eliminate unexpected sounds that would otherwise wake you up in the middle of your sleep.  Keep your bedroom technology free. Avoid TV and electronics 1-2 hours prior to bedtime. Also, turn the clock around to avoid clock-watching. Thoughts of the time ca cause frustration and make it more difficult to go to sleep.   DO NOT TRY TOO HARD TO SLEEP. JUST ALLOW SLEEP TO UNFOLD.  Other things to avoid to help  Avoid  caffeine (including chocolate) intake in the late afternoon and early evening. imit the amount of caffeine and " consume before noon.   Avoid smoking 2-3 hours before bedtime. Like caffeine, nicotine in cigarette smoking acts a stimulant.   Avoid alcohol intake 2-3 hours before bedtime. Although alcohol may seem to help you fall asleep more easily as it slows down brain activity, it also disrupts your sleep during the night by causing frequent awakenings as the effect of alcohol wears off. Also, alcohol worsens snoring and sleep apnea  Avoid eating a heavy meal (high-fat, high-carbohydrate, gas-producing foods) at dinner or 2-3 hours before bedtime.    Avoid drinking more than 8 oz liquids in the evening. Restrict fluids after dinner and void at bedtime.  Avoid physical exercise or hot shower / warm bath within 2 hours of bedtime. Regular exercise can improve sleep quality, but exercising or having a warm bath too close to bedtime can disrupt sleep onset. The best time to exercise to help sleep is in the late afternoon or early evening but not within 2 hours of bedtime. Warm baths can be taken in the evening but not within 2 hours of going to bed.   Avoid sleeping with pets or kids if they appear to bother your sleep and/or sleep quality.    MOST IMPORTANTLY:  BE PATIENT!  BE CONSISTENT!  Your sleep problem developed over time so it will take some time and consistency to return to a more normal sleep pattern. By following the suggestions listed above, you should see gradual sleep improvements over time.    Also you have been recommended to do Cognitive Behavioral Therapy for Insomnia (CBT-I). CBT-I is widely recognized as an effective treatment for a wide range of insomnias. CBT-I is typically made up of a number of components including assessment, behavioral and cognitive interventions, motivational techniques, and relapse prevention skills. An overwhelming amount of evidence suggests that CBT-I is as effective as hypnotics and the newer non-benzodiazepine sleep aides in the acute treatment and is more effective than  medication in the long term treatment of insomnia.     Below are some resources for CBT-I:  CBT-I at  with Ofelia Maia, NP  Go[employer]- online course via [facility]. Self-guided. One time charge to sign up.  [facility]- Free self-guided course from the [facility] https://www.[facility].[place]insomnia/  Dr. Michelle - one on one CBT-I service www.[employer]     Followup in 1 month

## 2023-11-17 DIAGNOSIS — G47.00 INSOMNIA DISORDER WITH NON-SLEEP DISORDER MENTAL COMORBIDITY: ICD-10-CM

## 2023-11-17 DIAGNOSIS — F41.9 ANXIETY: ICD-10-CM

## 2023-11-17 RX ORDER — MIRTAZAPINE 7.5 MG/1
7.5 TABLET, FILM COATED ORAL NIGHTLY
Qty: 30 TABLET | Refills: 0 | Status: SHIPPED | OUTPATIENT
Start: 2023-11-17 | End: 2023-12-17

## 2023-11-21 ENCOUNTER — TELEPHONE (OUTPATIENT)
Dept: RADIOLOGY | Facility: CLINIC | Age: 58
End: 2023-11-21

## 2023-12-14 ENCOUNTER — APPOINTMENT (OUTPATIENT)
Dept: SLEEP MEDICINE | Facility: CLINIC | Age: 58
End: 2023-12-14
Payer: COMMERCIAL